# Patient Record
Sex: FEMALE | Race: WHITE | Employment: OTHER | URBAN - METROPOLITAN AREA
[De-identification: names, ages, dates, MRNs, and addresses within clinical notes are randomized per-mention and may not be internally consistent; named-entity substitution may affect disease eponyms.]

---

## 2018-09-10 ENCOUNTER — HOSPITAL ENCOUNTER (INPATIENT)
Age: 73
LOS: 2 days | Discharge: HOME | DRG: 603 | End: 2018-09-12
Attending: SPECIALIST | Admitting: FAMILY MEDICINE
Payer: MEDICARE

## 2018-09-10 DIAGNOSIS — L03.211 FACIAL CELLULITIS: Primary | ICD-10-CM

## 2018-09-10 DIAGNOSIS — H60.12 CELLULITIS OF LEFT EARLOBE: ICD-10-CM

## 2018-09-10 PROBLEM — L03.90 CELLULITIS: Status: ACTIVE | Noted: 2018-09-10

## 2018-09-10 LAB
-: ABNORMAL
ABSOLUTE EOS #: 0 K/UL (ref 0–0.4)
ABSOLUTE IMMATURE GRANULOCYTE: ABNORMAL K/UL (ref 0–0.3)
ABSOLUTE LYMPH #: 1.2 K/UL (ref 1–4.8)
ABSOLUTE MONO #: 0.9 K/UL (ref 0.1–1.2)
ALBUMIN SERPL-MCNC: 3.6 G/DL (ref 3.5–5.2)
ALBUMIN/GLOBULIN RATIO: 1 (ref 1–2.5)
ALP BLD-CCNC: 128 U/L (ref 35–104)
ALT SERPL-CCNC: 111 U/L (ref 5–33)
AMORPHOUS: ABNORMAL
ANION GAP SERPL CALCULATED.3IONS-SCNC: 15 MMOL/L (ref 9–17)
AST SERPL-CCNC: 107 U/L
BACTERIA: ABNORMAL
BASOPHILS # BLD: 0 % (ref 0–2)
BASOPHILS ABSOLUTE: 0 K/UL (ref 0–0.2)
BILIRUB SERPL-MCNC: 0.4 MG/DL (ref 0.3–1.2)
BILIRUBIN URINE: ABNORMAL
BUN BLDV-MCNC: 13 MG/DL (ref 8–23)
BUN/CREAT BLD: ABNORMAL (ref 9–20)
CALCIUM SERPL-MCNC: 9.2 MG/DL (ref 8.6–10.4)
CASTS UA: ABNORMAL /LPF (ref 0–2)
CHLORIDE BLD-SCNC: 95 MMOL/L (ref 98–107)
CO2: 24 MMOL/L (ref 20–31)
COLOR: YELLOW
COMMENT UA: ABNORMAL
CREAT SERPL-MCNC: 0.7 MG/DL (ref 0.5–0.9)
CRYSTALS, UA: ABNORMAL /HPF
DIFFERENTIAL TYPE: ABNORMAL
EOSINOPHILS RELATIVE PERCENT: 0 % (ref 1–4)
EPITHELIAL CELLS UA: ABNORMAL /HPF (ref 0–5)
GFR AFRICAN AMERICAN: >60 ML/MIN
GFR NON-AFRICAN AMERICAN: >60 ML/MIN
GFR SERPL CREATININE-BSD FRML MDRD: ABNORMAL ML/MIN/{1.73_M2}
GFR SERPL CREATININE-BSD FRML MDRD: ABNORMAL ML/MIN/{1.73_M2}
GLUCOSE BLD-MCNC: 136 MG/DL (ref 70–99)
GLUCOSE URINE: NEGATIVE
HCT VFR BLD CALC: 40.9 % (ref 36–46)
HEMOGLOBIN: 13.5 G/DL (ref 12–16)
IMMATURE GRANULOCYTES: ABNORMAL %
KETONES, URINE: ABNORMAL
LACTIC ACID, SEPSIS WHOLE BLOOD: NORMAL MMOL/L (ref 0.5–1.9)
LACTIC ACID, SEPSIS: 1 MMOL/L (ref 0.5–1.9)
LACTIC ACID: 3 MMOL/L (ref 0.5–2.2)
LEUKOCYTE ESTERASE, URINE: ABNORMAL
LYMPHOCYTES # BLD: 16 % (ref 24–44)
MCH RBC QN AUTO: 29.8 PG (ref 26–34)
MCHC RBC AUTO-ENTMCNC: 32.9 G/DL (ref 31–37)
MCV RBC AUTO: 90.5 FL (ref 80–100)
MONOCYTES # BLD: 11 % (ref 2–11)
MUCUS: ABNORMAL
NITRITE, URINE: NEGATIVE
NRBC AUTOMATED: ABNORMAL PER 100 WBC
OTHER OBSERVATIONS UA: ABNORMAL
PDW BLD-RTO: 13.2 % (ref 12.5–15.4)
PH UA: 5.5 (ref 5–8)
PLATELET # BLD: 176 K/UL (ref 140–450)
PLATELET ESTIMATE: ABNORMAL
PMV BLD AUTO: 9.3 FL (ref 6–12)
POTASSIUM SERPL-SCNC: 3.8 MMOL/L (ref 3.7–5.3)
PROTEIN UA: ABNORMAL
RBC # BLD: 4.52 M/UL (ref 4–5.2)
RBC # BLD: ABNORMAL 10*6/UL
RBC UA: ABNORMAL /HPF (ref 0–2)
RENAL EPITHELIAL, UA: ABNORMAL /HPF
SEG NEUTROPHILS: 73 % (ref 36–66)
SEGMENTED NEUTROPHILS ABSOLUTE COUNT: 5.6 K/UL (ref 1.8–7.7)
SODIUM BLD-SCNC: 134 MMOL/L (ref 135–144)
SPECIFIC GRAVITY UA: 1.01 (ref 1–1.03)
TOTAL PROTEIN: 7.1 G/DL (ref 6.4–8.3)
TRICHOMONAS: ABNORMAL
TURBIDITY: CLEAR
URINE HGB: ABNORMAL
UROBILINOGEN, URINE: NORMAL
WBC # BLD: 7.8 K/UL (ref 3.5–11)
WBC # BLD: ABNORMAL 10*3/UL
WBC UA: ABNORMAL /HPF (ref 0–5)
YEAST: ABNORMAL

## 2018-09-10 PROCEDURE — 87040 BLOOD CULTURE FOR BACTERIA: CPT

## 2018-09-10 PROCEDURE — 99284 EMERGENCY DEPT VISIT MOD MDM: CPT

## 2018-09-10 PROCEDURE — 85025 COMPLETE CBC W/AUTO DIFF WBC: CPT

## 2018-09-10 PROCEDURE — 6360000002 HC RX W HCPCS: Performed by: SPECIALIST

## 2018-09-10 PROCEDURE — 2580000003 HC RX 258: Performed by: INTERNAL MEDICINE

## 2018-09-10 PROCEDURE — 80053 COMPREHEN METABOLIC PANEL: CPT

## 2018-09-10 PROCEDURE — 2580000003 HC RX 258: Performed by: SPECIALIST

## 2018-09-10 PROCEDURE — 6360000002 HC RX W HCPCS: Performed by: INTERNAL MEDICINE

## 2018-09-10 PROCEDURE — 87086 URINE CULTURE/COLONY COUNT: CPT

## 2018-09-10 PROCEDURE — 99222 1ST HOSP IP/OBS MODERATE 55: CPT | Performed by: NURSE PRACTITIONER

## 2018-09-10 PROCEDURE — 81001 URINALYSIS AUTO W/SCOPE: CPT

## 2018-09-10 PROCEDURE — 1200000000 HC SEMI PRIVATE

## 2018-09-10 PROCEDURE — 86403 PARTICLE AGGLUT ANTBDY SCRN: CPT

## 2018-09-10 PROCEDURE — 36415 COLL VENOUS BLD VENIPUNCTURE: CPT

## 2018-09-10 PROCEDURE — 83605 ASSAY OF LACTIC ACID: CPT

## 2018-09-10 RX ORDER — SODIUM CHLORIDE 0.9 % (FLUSH) 0.9 %
10 SYRINGE (ML) INJECTION EVERY 12 HOURS SCHEDULED
Status: DISCONTINUED | OUTPATIENT
Start: 2018-09-10 | End: 2018-09-12 | Stop reason: HOSPADM

## 2018-09-10 RX ORDER — POTASSIUM CHLORIDE 20 MEQ/1
40 TABLET, EXTENDED RELEASE ORAL PRN
Status: DISCONTINUED | OUTPATIENT
Start: 2018-09-10 | End: 2018-09-12 | Stop reason: HOSPADM

## 2018-09-10 RX ORDER — POTASSIUM CHLORIDE 20MEQ/15ML
40 LIQUID (ML) ORAL PRN
Status: DISCONTINUED | OUTPATIENT
Start: 2018-09-10 | End: 2018-09-12 | Stop reason: HOSPADM

## 2018-09-10 RX ORDER — ONDANSETRON 2 MG/ML
4 INJECTION INTRAMUSCULAR; INTRAVENOUS EVERY 6 HOURS PRN
Status: DISCONTINUED | OUTPATIENT
Start: 2018-09-10 | End: 2018-09-10 | Stop reason: ALTCHOICE

## 2018-09-10 RX ORDER — ONDANSETRON 4 MG/1
4 TABLET, ORALLY DISINTEGRATING ORAL EVERY 6 HOURS PRN
Status: DISCONTINUED | OUTPATIENT
Start: 2018-09-10 | End: 2018-09-12 | Stop reason: HOSPADM

## 2018-09-10 RX ORDER — SODIUM CHLORIDE 0.9 % (FLUSH) 0.9 %
10 SYRINGE (ML) INJECTION PRN
Status: DISCONTINUED | OUTPATIENT
Start: 2018-09-10 | End: 2018-09-12 | Stop reason: HOSPADM

## 2018-09-10 RX ORDER — 0.9 % SODIUM CHLORIDE 0.9 %
1000 INTRAVENOUS SOLUTION INTRAVENOUS ONCE
Status: COMPLETED | OUTPATIENT
Start: 2018-09-10 | End: 2018-09-10

## 2018-09-10 RX ORDER — IBANDRONATE SODIUM 150 MG/1
150 TABLET, FILM COATED ORAL
COMMUNITY

## 2018-09-10 RX ORDER — BISACODYL 10 MG
10 SUPPOSITORY, RECTAL RECTAL DAILY PRN
Status: DISCONTINUED | OUTPATIENT
Start: 2018-09-10 | End: 2018-09-12 | Stop reason: HOSPADM

## 2018-09-10 RX ORDER — ACETAMINOPHEN 325 MG/1
650 TABLET ORAL EVERY 4 HOURS PRN
Status: DISCONTINUED | OUTPATIENT
Start: 2018-09-10 | End: 2018-09-12 | Stop reason: HOSPADM

## 2018-09-10 RX ORDER — NICOTINE 21 MG/24HR
1 PATCH, TRANSDERMAL 24 HOURS TRANSDERMAL DAILY PRN
Status: DISCONTINUED | OUTPATIENT
Start: 2018-09-10 | End: 2018-09-12 | Stop reason: HOSPADM

## 2018-09-10 RX ORDER — VANCOMYCIN HYDROCHLORIDE 1 G/200ML
1000 INJECTION, SOLUTION INTRAVENOUS EVERY 24 HOURS
Status: DISCONTINUED | OUTPATIENT
Start: 2018-09-11 | End: 2018-09-12 | Stop reason: HOSPADM

## 2018-09-10 RX ORDER — ONDANSETRON 2 MG/ML
4 INJECTION INTRAMUSCULAR; INTRAVENOUS EVERY 6 HOURS PRN
Status: DISCONTINUED | OUTPATIENT
Start: 2018-09-10 | End: 2018-09-12 | Stop reason: HOSPADM

## 2018-09-10 RX ORDER — SODIUM CHLORIDE 9 MG/ML
INJECTION, SOLUTION INTRAVENOUS CONTINUOUS
Status: DISCONTINUED | OUTPATIENT
Start: 2018-09-10 | End: 2018-09-10 | Stop reason: SDUPTHER

## 2018-09-10 RX ORDER — SODIUM CHLORIDE 9 MG/ML
INJECTION, SOLUTION INTRAVENOUS CONTINUOUS
Status: DISCONTINUED | OUTPATIENT
Start: 2018-09-10 | End: 2018-09-11

## 2018-09-10 RX ORDER — MAGNESIUM SULFATE 1 G/100ML
1 INJECTION INTRAVENOUS PRN
Status: DISCONTINUED | OUTPATIENT
Start: 2018-09-10 | End: 2018-09-12 | Stop reason: HOSPADM

## 2018-09-10 RX ORDER — ACETAMINOPHEN 500 MG
500 TABLET ORAL EVERY 6 HOURS PRN
COMMUNITY

## 2018-09-10 RX ORDER — POTASSIUM CHLORIDE 7.45 MG/ML
10 INJECTION INTRAVENOUS PRN
Status: DISCONTINUED | OUTPATIENT
Start: 2018-09-10 | End: 2018-09-12 | Stop reason: HOSPADM

## 2018-09-10 RX ADMIN — SODIUM CHLORIDE 1000 ML: 9 INJECTION, SOLUTION INTRAVENOUS at 17:35

## 2018-09-10 RX ADMIN — ENOXAPARIN SODIUM 40 MG: 40 INJECTION SUBCUTANEOUS at 22:20

## 2018-09-10 RX ADMIN — VANCOMYCIN HYDROCHLORIDE 1750 MG: 1 INJECTION, POWDER, LYOPHILIZED, FOR SOLUTION INTRAVENOUS at 21:17

## 2018-09-10 RX ADMIN — SODIUM CHLORIDE: 9 INJECTION, SOLUTION INTRAVENOUS at 21:17

## 2018-09-10 RX ADMIN — PIPERACILLIN SODIUM,TAZOBACTAM SODIUM 3.38 G: 3; .375 INJECTION, POWDER, FOR SOLUTION INTRAVENOUS at 18:15

## 2018-09-10 ASSESSMENT — ENCOUNTER SYMPTOMS
SHORTNESS OF BREATH: 0
FACIAL SWELLING: 1
COUGH: 0

## 2018-09-10 ASSESSMENT — PAIN SCALES - GENERAL: PAINLEVEL_OUTOF10: 0

## 2018-09-10 NOTE — ED NOTES
Ari Osborn MD at bedside updating pt on results and plan of care.          Kaylie Mtz RN  09/10/18 9940
Pt updated on ETA of [address] [facility] ambulance. She states that she may have a friend that can transport her. Dr Pop Patel notified, hold Wilbert at this time.        Alejandro Prater RN  09/10/18 0427
[facility] Access contacted for admission to [address] Cohen, RN  09/10/18 1403
Dementia    DM (diabetes mellitus)    Gastric adenocarcinoma  s/p resection  HTN (hypertension)    Hypercholesteremia    Vertigo

## 2018-09-10 NOTE — Clinical Note
Patient Class: Inpatient [101]   REQUIRED: Diagnosis: Cellulitis [135088]   Estimated Length of Stay: Estimated stay of more than 2 midnights   Future Attending Provider: Jose A Sandoval [[id number]]

## 2018-09-10 NOTE — ED PROVIDER NOTES
[facility]  [address] [address]  Phone: [phone]      Pt Name: Barbara Moreira  MRN: [id number]  [place] 1945  Date of evaluation: 9/10/2018      CHIEF COMPLAINT       Chief Complaint   Patient presents with    Facial Swelling     Had blood taken on fri, 2 hrs had vomiting/diarrhea, felt exhausted and facial swelling began in ears forehead and corner of R eye; denies throat swelling or SOB         HISTORY OF PRESENT ILLNESS    Barbara Moreira is a 68 y.o. female who presents   Chief Complaint   Patient presents with    Facial Swelling     Had blood taken on fri, 2 hrs had vomiting/diarrhea, felt exhausted and facial swelling began in ears forehead and corner of R eye; denies throat swelling or SOB   . 80-year-old female patient presents to the emergency department complaining of swelling of the right upper eyelid, forehead and left ear pinna patient states that she has had blood drawn for the routine labs 3 days ago and about 8 a.m. in the morning ordered by her primary care physician and she started having vomiting and diarrhea at about 10 a.m. Vomiting and diarrhea lasted about 12 hours and then resolved. She felt warm at home but temperature not checked. She denies any hematemesis, melena or hematochezia. She denies any abdominal pain, urinary frequency, urgency, dysuria or hematuria. She denies any chest pain, shortness of breath, lightheadedness, dizziness, palpitations or diaphoresis. There are no exacerbating or relieving factors. Patient denies any history of diabetes mellitus or any other immunocompromised condition. REVIEW OF SYSTEMS       Review of Systems   HENT: Positive for ear pain and facial swelling. Respiratory: Negative for cough and shortness of breath. Cardiovascular: Negative for chest pain. Neurological: Negative for dizziness and light-headedness. All other systems reviewed and are negative.          PAST MEDICAL HISTORY    has a past medical history of Hypertension. SURGICAL HISTORY      has a past surgical history that includes Wrist fracture surgery. CURRENT MEDICATIONS       Previous Medications    ACETAMINOPHEN (TYLENOL) 500 MG TABLET    Take 500 mg by mouth every 6 hours as needed for Pain    IBUPROFEN (MOTRIN PO)    Take by mouth       ALLERGIES     is allergic to alexia-seltzer heartburn [sodium bicarbonate-citric acid] and contrast [iodides]. FAMILY HISTORY     has no family status information on file. family history is not on file. SOCIAL HISTORY      reports that she has never smoked. She has never used smokeless tobacco. She reports that she does not drink alcohol or use drugs. PHYSICAL EXAM     INITIAL VITALS:  height is 5' (1.524 m) and weight is 70.3 kg (155 lb). Her oral temperature is 98.3 °F (36.8 °C). Her blood pressure is 145/88 (abnormal) and her pulse is 80. Her respiration is 16 and oxygen saturation is 98%. Physical Exam   Constitutional: She is oriented to person, place, and time. She appears well-developed and well-nourished. HENT:   Head: Normocephalic and atraumatic. Left Ear: There is swelling and tenderness. Nose: Nose normal.   Mouth/Throat: Oropharynx is clear and moist.   Patient has erythema, edema and tenderness of the left ear pinna suggestive of cellulitis. There is no definite fluctuance or abscess at this time. Patient is  erythema and edema on the forehead with local temperature slightly raised. Eyes: Pupils are equal, round, and reactive to light. EOM are normal.   Neck: Normal range of motion. Neck supple. Cardiovascular: Normal rate, regular rhythm, normal heart sounds and intact distal pulses. No murmur heard. Pulmonary/Chest: Effort normal and breath sounds normal. No respiratory distress. Abdominal: Soft. Bowel sounds are normal. She exhibits no distension. There is no tenderness. Neurological: She is alert and oriented to person, place, and time. Skin: Skin is warm and dry. Nursing note and vitals reviewed. DIFFERENTIAL DIAGNOSIS/ MDM:     Bilateral forehead and left ear pinna cellulitis    DIAGNOSTIC RESULTS     EKG: All EKG's are interpreted by the Emergency Department Physician who either signs or Co-signs this chart in the absence of a cardiologist.    None obtained    RADIOLOGY:   I directly visualized the following  images and reviewed the radiologist interpretations:  No orders to display      None obtained      LABS:  Labs Reviewed   CBC WITH AUTO DIFFERENTIAL - Abnormal; Notable for the following:        Result Value    Seg Neutrophils 73 (*)     Lymphocytes 16 (*)     Eosinophils % 0 (*)     All other components within normal limits   COMPREHENSIVE METABOLIC PANEL - Abnormal; Notable for the following:     Glucose 136 (*)     Sodium 134 (*)     Chloride 95 (*)     Alkaline Phosphatase 128 (*)      (*)      (*)     All other components within normal limits   URINE RT REFLEX TO CULTURE - Abnormal; Notable for the following:     Bilirubin Urine NEGATIVE  Verified by ictotest. (*)     Ketones, Urine MODERATE (*)     Urine Hgb LARGE (*)     Protein, UA 2+ (*)     Leukocyte Esterase, Urine SMALL (*)     All other components within normal limits   LACTIC ACID - Abnormal; Notable for the following:     Lactic Acid 3.0 (*)     All other components within normal limits   MICROSCOPIC URINALYSIS - Abnormal; Notable for the following:     Bacteria, UA FEW (*)     Other Observations UA Culture ordered based on defined criteria.  (*)     Yeast, UA FEW (*)     All other components within normal limits   URINE CULTURE CLEAN CATCH   CULTURE BLOOD #1   CULTURE BLOOD #1       I reviewed all the lab results, patient has elevated lactic acid level and possible early urinary tract infection    EMERGENCY DEPARTMENT COURSE:   Vitals:    Vitals:    09/10/18 1516 09/10/18 1655   BP: (!) 170/102 (!) 145/88   Pulse: 94 80   Resp: 18 16   Temp: 98.3 °F (36.8 °C)    TempSrc: Oral    SpO2: 98%    Weight: 70.3 kg (155 lb)    Height: 5' (1.524 m)      -------------------------  BP: (!) 145/88, Temp: 98.3 °F (36.8 °C), Pulse: 80, Resp: 16    Orders Placed This Encounter   Medications    piperacillin-tazobactam (ZOSYN) 3.375 g in dextrose 5 % 50 mL IVPB (mini-bag)    vancomycin (VANCOCIN) 1,000 mg in dextrose 5 % 250 mL IVPB    0.9 % sodium chloride bolus    0.9 % sodium chloride infusion    sterile water injection 20 mL       During emergency department course, blood cultures were obtained and patient was given normal saline bolus followed by infusion as well as vancomycin 1 g and Zosyn 3.375 g IV piggyback. Patient will benefit from the hospitalization for IV fluids and IV antibiotics and follow lactic acid level closely. Patient is willing to be admitted at [facility].  I discussed the case with Jose Angulo , and he accepted the patient is a direct admit as an inpatient on [facility] floor. I have reviewed the disposition diagnosis with the patient and or their family/guardian. I have answered their questions and given discharge instructions. They voiced understanding of these instructions and did not have any further questions or complaints. Re-evaluation Notes    Patient is resting comfortably and does not appear to be in any pain or distress. PROCEDURES:  None    FINAL IMPRESSION      1. Facial cellulitis    2.  Cellulitis of left earlobe          DISPOSITION/PLAN   DISPOSITION Admitted 09/10/2018 06:01:46 PM      Condition on Disposition    Stable    PATIENT REFERRED TO:  Zeynep Barnes MD  [address]  [address]  [phone]            DISCHARGE MEDICATIONS:  New Prescriptions    No medications on file       (Please note that portions of this note were completed with a voice recognition program.  Efforts were made to edit the dictations but occasionally words are mis-transcribed.)    Clement MD,

## 2018-09-11 PROBLEM — R19.7 NAUSEA VOMITING AND DIARRHEA: Status: ACTIVE | Noted: 2018-09-11

## 2018-09-11 PROBLEM — R50.9 FEVER: Status: ACTIVE | Noted: 2018-09-11

## 2018-09-11 PROBLEM — I10 HYPERTENSION: Status: ACTIVE | Noted: 2018-09-11

## 2018-09-11 PROBLEM — R11.2 NAUSEA VOMITING AND DIARRHEA: Status: ACTIVE | Noted: 2018-09-11

## 2018-09-11 LAB
ANION GAP SERPL CALCULATED.3IONS-SCNC: 13 MMOL/L (ref 9–17)
BUN BLDV-MCNC: 8 MG/DL (ref 8–23)
BUN/CREAT BLD: 11 (ref 9–20)
CALCIUM SERPL-MCNC: 8.5 MG/DL (ref 8.6–10.4)
CHLORIDE BLD-SCNC: 102 MMOL/L (ref 98–107)
CO2: 24 MMOL/L (ref 20–31)
CREAT SERPL-MCNC: 0.7 MG/DL (ref 0.5–0.9)
CULTURE: ABNORMAL
GFR AFRICAN AMERICAN: >60 ML/MIN
GFR NON-AFRICAN AMERICAN: >60 ML/MIN
GFR SERPL CREATININE-BSD FRML MDRD: ABNORMAL ML/MIN/{1.73_M2}
GFR SERPL CREATININE-BSD FRML MDRD: ABNORMAL ML/MIN/{1.73_M2}
GLUCOSE BLD-MCNC: 104 MG/DL (ref 70–99)
HCT VFR BLD CALC: 36.6 % (ref 36–46)
HEMOGLOBIN: 12.1 G/DL (ref 12–16)
Lab: ABNORMAL
MAGNESIUM: 1.9 MG/DL (ref 1.6–2.6)
MCH RBC QN AUTO: 29.7 PG (ref 26–34)
MCHC RBC AUTO-ENTMCNC: 33 G/DL (ref 31–37)
MCV RBC AUTO: 90.2 FL (ref 80–100)
NRBC AUTOMATED: NORMAL PER 100 WBC
PDW BLD-RTO: 13.2 % (ref 11.5–14.5)
PLATELET # BLD: 200 K/UL (ref 130–400)
PMV BLD AUTO: 8.4 FL (ref 6–12)
POTASSIUM SERPL-SCNC: 3.3 MMOL/L (ref 3.7–5.3)
RBC # BLD: 4.06 M/UL (ref 4–5.2)
SODIUM BLD-SCNC: 139 MMOL/L (ref 135–144)
SPECIMEN DESCRIPTION: ABNORMAL
STATUS: ABNORMAL
WBC # BLD: 7 K/UL (ref 3.5–11)

## 2018-09-11 PROCEDURE — 99232 SBSQ HOSP IP/OBS MODERATE 35: CPT | Performed by: INTERNAL MEDICINE

## 2018-09-11 PROCEDURE — 80048 BASIC METABOLIC PNL TOTAL CA: CPT

## 2018-09-11 PROCEDURE — 6370000000 HC RX 637 (ALT 250 FOR IP): Performed by: INTERNAL MEDICINE

## 2018-09-11 PROCEDURE — 1200000000 HC SEMI PRIVATE

## 2018-09-11 PROCEDURE — 2580000003 HC RX 258: Performed by: INTERNAL MEDICINE

## 2018-09-11 PROCEDURE — 83735 ASSAY OF MAGNESIUM: CPT

## 2018-09-11 PROCEDURE — 6360000002 HC RX W HCPCS: Performed by: INTERNAL MEDICINE

## 2018-09-11 PROCEDURE — 85027 COMPLETE CBC AUTOMATED: CPT

## 2018-09-11 PROCEDURE — 36415 COLL VENOUS BLD VENIPUNCTURE: CPT

## 2018-09-11 RX ADMIN — SODIUM CHLORIDE: 9 INJECTION, SOLUTION INTRAVENOUS at 12:49

## 2018-09-11 RX ADMIN — VANCOMYCIN HYDROCHLORIDE 1000 MG: 1 INJECTION, SOLUTION INTRAVENOUS at 21:42

## 2018-09-11 RX ADMIN — POTASSIUM CHLORIDE 40 MEQ: 20 TABLET, EXTENDED RELEASE ORAL at 06:58

## 2018-09-11 ASSESSMENT — PAIN SCALES - GENERAL
PAINLEVEL_OUTOF10: 0

## 2018-09-11 NOTE — CARE COORDINATION
Case Management Initial Discharge Plan  Maris Chandra,         Readmission Risk              Risk of Unplanned Readmission:        6               Met with:patient to discuss discharge plans. Information verified: address, contacts, phone number, , insurance Yes  PCP: Jennifer Griffiths MD  Date of last visit:  6 months    Insurance Provider: medicare and cypProfind benefit    Discharge Planning  Current Residence:  apt  Living Arrangements:  Alone   Home has 1 stories/6 stairs to climb  Support Systems:  Friends/Neighbors  Current Services PTA:   none Agency:  none  Patient able to perform ADL's:Independent  DME in home:   none  DME used to aid ambulation prior to admission:    none  DME used during admission:   none    Potential Assistance Needed:  N/A    Pharmacy: [facility] Medications:  No  Does patient want to participate in local refill/ meds to beds program?  Yes    Patient agreeable to home care: No  [place] of choice provided:  n/a      Type of Home Care Services:  None  Patient expects to be discharged to:  home    Prior SNF/Rehab Placement and Facility:  none  Agreeable to SNF/Rehab: No  [place] of choice provided: n/a   Evaluation: no    Expected Discharge date:  18  Follow Up Appointment: Best Day/ Time: Thursday AM    Transportation provider: self  Transportation arrangements needed for discharge: No    Discharge Plan: home, independent. Met with pt at bedside. Admitted for facial and left ear cellulitis. Currently improving on IV vanco.  Plan for discharge in 24 hr.   Pt lives alone and is independent and works full time. Pt's car is in the parking lot for pt to drive home at discharge. No dc needs.   Pt has appt with Dr. Masoud Milian  at 8 am         Electronically signed by Yimi Tracy RN on 18 at 3:51 PM

## 2018-09-11 NOTE — H&P
[facility]    HISTORY AND PHYSICAL EXAMINATION            Date:   9/10/2018  Patient name:  Juan Jiang  Date of admission:  9/10/2018  3:07 PM  MRN:   [id number]  Account:  [de-identified]  YOB: 1945  PCP:    Juliano Pascal MD  Room:   2101/2101-01  Code Status:    Full Code    Chief Complaint:     Chief Complaint   Patient presents with    Facial Swelling     Had blood taken on fri, 2 hrs had vomiting/diarrhea, felt exhausted and facial swelling began in ears forehead and corner of R eye; denies throat swelling or SOB       History Obtained From:     Patient and electronic medical record. History of Present Illness: The patient is a 68 y.o.  female who came to the ER with complaints of facial swelling and ear pain. The patient states she had a routine blood drawn 3 days ago and developed nausea, vomiting and diarrhea shortly after. She reports this persisted for approximately 12hrs. She states she had accompanying and persistent warmness and states she thinks she had a fever but did not check her temperature at home. She also reports a lack of appetite. She the states that 1 day ago she had swelling to her right eyelid that crossed over her forehead and spread to her left ear. She states the swelling to her eyelid and forehead greatly improved, but the swelling to her left ear persists. She does have marked swelling and erythema to her left ear, and a diffuse macular rash to the back of her neck. She has accompanying cervical lymphadenopathy. She is febrile and hypertensive.        Past Medical History:     Past Medical History:   Diagnosis Date    Hypertension     monitoring for HTN        Past Surgical History:     Past Surgical History:   Procedure Laterality Date    WRIST FRACTURE SURGERY Right     ORIF        Medications Prior to Admission:     Prior to Admission medications    Medication Sig Start Date End Date NEG    Bilirubin Urine NEGATIVE  Verified by ictotest. (A) NEG    Ketones, Urine MODERATE (A) NEG    Specific Gravity, UA 1.015 1.005 - 1.030    Urine Hgb LARGE (A) NEG    pH, UA 5.5 5.0 - 8.0    Protein, UA 2+ (A) NEG    Urobilinogen, Urine Normal NORM    Nitrite, Urine NEGATIVE NEG    Leukocyte Esterase, Urine SMALL (A) NEG    Urinalysis Comments NOT REPORTED    Microscopic Urinalysis    Collection Time: 09/10/18  4:09 PM   Result Value Ref Range    -          WBC, UA 5 TO 10 0 - 5 /HPF    RBC, UA 5 TO 10 0 - 2 /HPF    Casts UA NOT REPORTED 0 - 2 /LPF    Crystals UA NOT REPORTED NONE /HPF    Epithelial Cells UA 5 TO 10 0 - 5 /HPF    Renal Epithelial, Urine NOT REPORTED 0 /HPF    Bacteria, UA FEW (A) NONE    Mucus, UA NOT REPORTED NONE    Trichomonas, UA NOT REPORTED NONE    Amorphous, UA NOT REPORTED NONE    Other Observations UA Culture ordered based on defined criteria. (A) NREQ    Yeast, UA FEW (A) NONE   Lactate, Sepsis    Collection Time: 09/10/18  8:40 PM   Result Value Ref Range    Lactic Acid, Sepsis 1.0 0.5 - 1.9 mmol/L    Lactic Acid, Sepsis, Whole Blood NOT REPORTED 0.5 - 1.9 mmol/L       Imaging/Diagnostics:    N/A    Assessment :      Primary Problem  Facial cellulitis    Active Hospital Problems    Diagnosis Date Noted    Hypertension [I10] 09/11/2018    Fever [R50.9] 09/11/2018    Nausea vomiting and diarrhea [R11.2, R19.7] 09/11/2018    Facial cellulitis [D63.773]        Plan:     Patient status Admit as inpatient to the medical surgical unit. 1. Admit as inpatient. 2. IV fluids. 3. IV antibiotics. 4. Cultures pending. 5. Monitor labs. 6. Monitor vitals. 7. Monitor and control BP. 8. DVT prophylaxis. 9. General diet. 10. Activity as tolerated.       Consultations:   PHARMACY TO DOSE VANCOMYCIN    Patient is admitted as inpatient status because of co-morbidities listed above, severity of signs and symptoms as outlined, requirement for current medical therapies and most importantly

## 2018-09-12 VITALS
WEIGHT: 162.1 LBS | HEART RATE: 71 BPM | TEMPERATURE: 97.5 F | DIASTOLIC BLOOD PRESSURE: 77 MMHG | SYSTOLIC BLOOD PRESSURE: 135 MMHG | BODY MASS INDEX: 31.83 KG/M2 | HEIGHT: 60 IN | RESPIRATION RATE: 20 BRPM | OXYGEN SATURATION: 98 %

## 2018-09-12 LAB
BUN BLDV-MCNC: 7 MG/DL (ref 8–23)
CREAT SERPL-MCNC: 0.72 MG/DL (ref 0.5–0.9)
GFR AFRICAN AMERICAN: >60 ML/MIN
GFR NON-AFRICAN AMERICAN: >60 ML/MIN
GFR SERPL CREATININE-BSD FRML MDRD: ABNORMAL ML/MIN/{1.73_M2}
GFR SERPL CREATININE-BSD FRML MDRD: ABNORMAL ML/MIN/{1.73_M2}
POTASSIUM SERPL-SCNC: 3.9 MMOL/L (ref 3.7–5.3)

## 2018-09-12 PROCEDURE — 36415 COLL VENOUS BLD VENIPUNCTURE: CPT

## 2018-09-12 PROCEDURE — 82565 ASSAY OF CREATININE: CPT

## 2018-09-12 PROCEDURE — 99238 HOSP IP/OBS DSCHRG MGMT 30/<: CPT | Performed by: INTERNAL MEDICINE

## 2018-09-12 PROCEDURE — 84520 ASSAY OF UREA NITROGEN: CPT

## 2018-09-12 PROCEDURE — 84132 ASSAY OF SERUM POTASSIUM: CPT

## 2018-09-12 RX ORDER — DOXYCYCLINE HYCLATE 100 MG
100 TABLET ORAL 2 TIMES DAILY
Qty: 10 TABLET | Refills: 0 | Status: SHIPPED | OUTPATIENT
Start: 2018-09-12 | End: 2018-09-17

## 2018-09-12 ASSESSMENT — PAIN SCALES - GENERAL
PAINLEVEL_OUTOF10: 0
PAINLEVEL_OUTOF10: 0

## 2018-09-12 NOTE — PROGRESS NOTES
[facility]    Progress Note    9/12/2018    10:24 AM    Name:   Natasha Cintron  MRN:     [id number]     Acct:      [de-identified]   [facility]:   210/[facility] Day:  2  Admit Date:  9/10/2018  3:07 PM    PCP:   Zeynep Barnes MD  Code Status:  Full Code    Subjective:     C/C:   Chief Complaint   Patient presents with    Facial Swelling     Had blood taken on fri, 2 hrs had vomiting/diarrhea, felt exhausted and facial swelling began in ears forehead and corner of R eye; denies throat swelling or SOB     Interval History Status: improved. Feels a lot better today  Swelling/pain/redness of ear all significantly better    Brief History:     Per my CNP:  \"The patient is a 68 y.o.  female who came to the ER with complaints of facial swelling and ear pain. The patient states she had a routine blood drawn 3 days ago and developed nausea, vomiting and diarrhea shortly after. She reports this persisted for approximately 12hrs. She states she had accompanying and persistent warmness and states she thinks she had a fever but did not check her temperature at home. She also reports a lack of appetite. She the states that 1 day ago she had swelling to her right eyelid that crossed over her forehead and spread to her left ear. She states the swelling to her eyelid and forehead greatly improved, but the swelling to her left ear persists. She does have marked swelling and erythema to her left ear, and a diffuse macular rash to the back of her neck. She has accompanying cervical lymphadenopathy. \"    Review of Systems:     Constitutional:  negative for chills, fevers, sweats  Respiratory:  negative for cough, dyspnea on exertion, shortness of breath, wheezing  Cardiovascular:  negative for chest pain, chest pressure/discomfort, lower extremity edema, palpitations  Gastrointestinal:  negative for abdominal pain, constipation, diarrhea, nausea,

## 2018-09-12 NOTE — PROGRESS NOTES
effects of the drug and do not get any benefit from it. Moreover, taking an antibiotic when it is not needed can lead to the development of antibiotic resistance. When resistance develops, antibiotics may not be able to stop future infections. Every time someone takes an antibiotic they dont need, they increase their risk of developing a resistant infection in the future. Types of Adverse Drug Events Related to Antibiotics  Allergic Reactions  Every year, there are more than 140,000 emergency department visits for reactions to antibiotics. Almost four out of five (79%) emergency department visits for antibiotic-related adverse drug events are due to an allergic reaction. These reactions can range from mild rashes and itching to serious blistering skin reactions swelling of the face and throat, and breathing problems. Minimizing unnecessary antibiotic use is the best way to reduce the risk of adverse drug events from antibiotics. Patients should tell their doctors about any past drug reactions or allergies. C. difficile  C. difficile causes diarrhea linked to at least 14,000 [place] deaths each year. When a person takes antibiotics, good bacteria that protect against infection are destroyed for several months. During this time, patients can get sick from C. difficile picked up from contaminated surfaces or spread from a healthcare providers hands. Those most at risk are people, especially older adults, who take antibiotics and also get medical care. Take antibiotics exactly and only as prescribed. Drug Interactions and Side Effects  Antibiotics can interact with other drugs patients take, making those drugs or the antibiotics less effective. Some drug combinations can worsen the side effects of the antibiotic or other drug. Common side effects of antibiotics include nausea, diarrhea, and stomach pain. Sometimes these symptoms can lead to dehydration and other problems.  Patients should ask their doctors about drug interactions and the potential side effects of antibiotics.  The doctor should be told immediately if a patient has any side effects from antibiotics  Page last updated: February 24, 2017 Content source:   Centers for Disease Control and [employer] for Emerging and Zoonotic Infectious Diseases ([place])  Division of Healthcare Quality Promotion [facility] [place])

## 2018-09-12 NOTE — DISCHARGE SUMMARY
BILITOT 0.40 09/10/2018    LABALBU 3.6 09/10/2018    ALBUMIN 1.0 09/10/2018    LABGLOM >60 09/12/2018    GFRAA >60 09/12/2018    GFR      09/12/2018    GFR NOT REPORTED 09/12/2018    PROT 7.1 09/10/2018    CALCIUM 8.5 09/11/2018       ,   blood culture: negative and urine culture: positive for small growth strep ,     Radiology:    No results found. Consultations:    Consults:     Final Specialist Recommendations/Findings:   PHARMACY TO DOSE VANCOMYCIN      The patient was seen and examined on day of discharge and this discharge summary is in conjunction with any daily progress note from day of discharge. Discharge plan:     Disposition: Home    Physician Follow Up:     Cyndie Lara MD  [facility]  [address]  [phone]    Go on 9/14/2018  Hospital follow up at 8:00 am       Requiring Further Evaluation/Follow Up POST HOSPITALIZATION/Incidental Findings: ear cellulitis    Diet: regular diet    Activity: As tolerated    Instructions to Patient:     Discharge Medications:      Medication List      START taking these medications    doxycycline hyclate 100 MG tablet  Commonly known as:  VIBRA-TABS  Take 1 tablet by mouth 2 times daily for 5 days        CONTINUE taking these medications    acetaminophen 500 MG tablet  Commonly known as:  TYLENOL     BONIVA 150 MG tablet  Generic drug:  ibandronate     CALCIUM PO     MOTRIN PO     VITAMIN D PO           Where to Get Your Medications      These medications were sent to [facility] [place] [address] [phone] F [phone]  [address] [place] [address], [address] [place], [place] [address] [address]    Phone:  [phone]   · doxycycline hyclate 100 MG tablet         Time Spent on discharge is  20 mins in patient examination, evaluation, counseling as well as medication reconciliation, prescriptions for required medications, discharge plan and follow up.     Electronically signed by   Mary Alice Collins

## 2018-09-16 LAB
CULTURE: NORMAL
CULTURE: NORMAL
Lab: NORMAL
Lab: NORMAL
SPECIMEN DESCRIPTION: NORMAL
SPECIMEN DESCRIPTION: NORMAL
STATUS: NORMAL
STATUS: NORMAL

## 2022-07-08 ENCOUNTER — TELEPHONE (OUTPATIENT)
Dept: INTERNAL MEDICINE | Age: 77
End: 2022-07-08

## 2022-11-28 ENCOUNTER — APPOINTMENT (OUTPATIENT)
Dept: INTERNAL MEDICINE | Age: 77
End: 2022-11-28

## 2024-07-31 NOTE — FLOWSHEET NOTE
Patient anointed by Father Judd Patel. Writer charting on behalf of Florinda Weiss. 09/11/18 8813   Encounter Summary   Services provided to: Patient   Referral/Consult From: Rounding   [facility] (9/11/18; anointed)   Complexity of Encounter Low   Length of Encounter 15 minutes   Routine   Type Initial   Sacraments   Sacrament of Sick-Anointing Anointed  (9/11/18;  Fr Judd Patel)
No